# Patient Record
Sex: MALE | Race: OTHER | URBAN - METROPOLITAN AREA
[De-identification: names, ages, dates, MRNs, and addresses within clinical notes are randomized per-mention and may not be internally consistent; named-entity substitution may affect disease eponyms.]

---

## 2021-10-29 ENCOUNTER — NEW PATIENT COMPREHENSIVE (OUTPATIENT)
Dept: URBAN - METROPOLITAN AREA CLINIC 52 | Facility: CLINIC | Age: 66
End: 2021-10-29

## 2021-10-29 DIAGNOSIS — H57.13: ICD-10-CM

## 2021-10-29 DIAGNOSIS — H10.13: ICD-10-CM

## 2021-10-29 PROCEDURE — 99204 OFFICE O/P NEW MOD 45 MIN: CPT

## 2021-10-29 RX ORDER — TOBRAMYCIN AND DEXAMETHASONE 3; 1 MG/G; MG/G
1/2 OINTMENT OPHTHALMIC EVERY EVENING
Start: 2021-10-29

## 2021-10-29 RX ORDER — FLUOROMETHOLONE ACETATE 1 MG/ML
1 SUSPENSION/ DROPS OPHTHALMIC
Start: 2021-10-29

## 2021-10-29 ASSESSMENT — VISUAL ACUITY
OD_SC: 20/30
OS_SC: 20/30
OU_SC: J2
OD_PH: 20/25

## 2021-10-29 ASSESSMENT — TONOMETRY
OS_IOP_MMHG: 17
OD_IOP_MMHG: 17

## 2021-10-29 NOTE — PATIENT DISCUSSION
Recommend patient use allergy GTT 2 times a day no matter what on a regular basis. Restart oral allergy medication. Continue cool compress twice a day. Start Flarex TID OU until sample is gone and start Tobradex DOMINIC to both upper lids just at bed time. Will re check in 2 weeks. Recommend patient see an allergist to pin point what he is allergic too.

## 2021-11-12 ENCOUNTER — 2 WEEK FOLLOW-UP (OUTPATIENT)
Dept: URBAN - METROPOLITAN AREA CLINIC 52 | Facility: CLINIC | Age: 66
End: 2021-11-12

## 2021-11-12 DIAGNOSIS — H10.13: ICD-10-CM

## 2021-11-12 DIAGNOSIS — H57.13: ICD-10-CM

## 2021-11-12 PROCEDURE — 92012 INTRM OPH EXAM EST PATIENT: CPT

## 2021-11-12 RX ORDER — BUTALBITAL, ASPIRIN, CAFFEINE AND CODEINE PHOSPHATE 30; 50; 40; 325 MG/1; MG/1; MG/1; MG/1
1 CAPSULE ORAL TWICE A DAY
Start: 2021-11-12

## 2021-11-12 ASSESSMENT — TONOMETRY
OD_IOP_MMHG: 18
OS_IOP_MMHG: 16

## 2021-11-12 ASSESSMENT — VISUAL ACUITY
OS_SC: 20/25
OD_SC: 20/25

## 2021-11-12 NOTE — PATIENT DISCUSSION
Both eyes look better today on exam. Patient is to stop Flerex and start Zaditor BID OU. Patient can continue DOMINIC at bedtime. Please advise on appt

## 2022-11-18 ENCOUNTER — ESTABLISHED PATIENT (OUTPATIENT)
Dept: URBAN - METROPOLITAN AREA CLINIC 52 | Facility: CLINIC | Age: 67
End: 2022-11-18

## 2022-11-18 DIAGNOSIS — H25.13: ICD-10-CM

## 2022-11-18 DIAGNOSIS — H35.362: ICD-10-CM

## 2022-11-18 PROCEDURE — 92014 COMPRE OPH EXAM EST PT 1/>: CPT

## 2022-11-18 PROCEDURE — 92134 CPTRZ OPH DX IMG PST SGM RTA: CPT

## 2022-11-18 ASSESSMENT — VISUAL ACUITY
OD_SC: 20/30-1
OD_PH: 20/25
OS_GLARE: 20/30
OD_GLARE: 20/30
OS_SC: 20/25
OS_GLARE: 20/30
OU_SC: J2@14IN
OU_SC: 20/25
OD_GLARE: 20/40

## 2022-11-18 ASSESSMENT — KERATOMETRY
OS_K1POWER_DIOPTERS: 45.25
OD_AXISANGLE2_DEGREES: 7
OD_K2POWER_DIOPTERS: 45.75
OS_K2POWER_DIOPTERS: 46.00
OD_K1POWER_DIOPTERS: 45.00
OS_AXISANGLE_DEGREES: 8
OS_AXISANGLE2_DEGREES: 98
OD_AXISANGLE_DEGREES: 97

## 2022-11-18 ASSESSMENT — TONOMETRY
OS_IOP_MMHG: 16
OD_IOP_MMHG: 16

## 2022-11-18 NOTE — PATIENT DISCUSSION
Both eyes look better today on exam. Patient is to stop Flerex and start Zaditor BID OU. Patient can continue DOMINIC at bedtime.